# Patient Record
Sex: MALE | Race: WHITE | NOT HISPANIC OR LATINO | Employment: FULL TIME | URBAN - NONMETROPOLITAN AREA
[De-identification: names, ages, dates, MRNs, and addresses within clinical notes are randomized per-mention and may not be internally consistent; named-entity substitution may affect disease eponyms.]

---

## 2017-02-28 ENCOUNTER — OFFICE VISIT (OUTPATIENT)
Dept: GASTROENTEROLOGY | Facility: CLINIC | Age: 32
End: 2017-02-28

## 2017-02-28 VITALS
BODY MASS INDEX: 29.7 KG/M2 | SYSTOLIC BLOOD PRESSURE: 134 MMHG | DIASTOLIC BLOOD PRESSURE: 86 MMHG | HEIGHT: 67 IN | WEIGHT: 189.2 LBS | HEART RATE: 73 BPM

## 2017-02-28 DIAGNOSIS — K62.5 RECTAL BLEEDING: ICD-10-CM

## 2017-02-28 DIAGNOSIS — K62.89 ANAL OR RECTAL PAIN: Primary | ICD-10-CM

## 2017-02-28 PROCEDURE — 99213 OFFICE O/P EST LOW 20 MIN: CPT | Performed by: INTERNAL MEDICINE

## 2017-02-28 RX ORDER — LIDOCAINE HYDROCHLORIDE 10 MG/ML
0.1 INJECTION, SOLUTION EPIDURAL; INFILTRATION; INTRACAUDAL; PERINEURAL ONCE AS NEEDED
Status: CANCELLED | OUTPATIENT
Start: 2017-02-28

## 2017-02-28 RX ORDER — SODIUM CHLORIDE 0.9 % (FLUSH) 0.9 %
1-10 SYRINGE (ML) INJECTION AS NEEDED
Status: CANCELLED | OUTPATIENT
Start: 2017-03-02

## 2017-02-28 RX ORDER — DEXTROSE AND SODIUM CHLORIDE 5; .45 G/100ML; G/100ML
30 INJECTION, SOLUTION INTRAVENOUS CONTINUOUS PRN
Status: CANCELLED | OUTPATIENT
Start: 2017-03-02

## 2017-03-01 RX ORDER — HYDROCODONE BITARTRATE AND ACETAMINOPHEN 10; 325 MG/1; MG/1
1 TABLET ORAL EVERY 6 HOURS PRN
Status: ON HOLD | COMMUNITY
End: 2017-03-02

## 2017-03-01 RX ORDER — DEXTROSE AND SODIUM CHLORIDE 5; .45 G/100ML; G/100ML
20 INJECTION, SOLUTION INTRAVENOUS CONTINUOUS
Status: CANCELLED | OUTPATIENT
Start: 2017-03-02

## 2017-03-01 NOTE — PROGRESS NOTES
Chief Complaint   Patient presents with   • Colonoscopy Consultation     Raymon Gusman is a 31 y.o. male who presents with rectal bleeding and pain  HPI:  This patient was previously seen by me in 2016.  That note is available for review.  He has a family history of carcinoma the colon was felt he needed to have a colonoscopy but it was not done.    The patient states that he is having a great deal of pain in the rectal area with bowel movements.  His been present for 2 years.  In addition there is crampy abdominal pain.  There is frequent passage of blood per rectum.  States that we'll take him as long as 30-40 minutes to have a bowel movement.  He complains of severe pain with a bowel movement.    The patient had a CT scan of the abdomen and pelvis done in March 2016 which was normal.  He has never had a colonoscopy.  Past Medical History   Diagnosis Date   • Blood in stool    • Diarrhea    • Family history of malignant neoplasm of gastrointestinal tract    • Gastroesophageal reflux disease    • GERD (gastroesophageal reflux disease)    • Rectal hemorrhage      Past Surgical History   Procedure Laterality Date   • Appendectomy  At Age Of 11 - 12   • Other surgical history       Left Forearm - Steel Plate       Current Outpatient Prescriptions:   •  omeprazole (PriLOSEC) 40 MG capsule, Take 40 mg by mouth 2 (two) times a day., Disp: , Rfl:   •  tiZANidine (ZANAFLEX) 4 MG tablet, Take 4 mg by mouth every 8 (eight) hours as needed for muscle spasms., Disp: , Rfl:   •  HYDROcodone-acetaminophen (NORCO)  MG per tablet, Take 1 tablet by mouth Every 6 (Six) Hours As Needed for moderate pain (4-6)., Disp: , Rfl:   No Known Allergies  Social History     Social History   • Marital status: Single     Spouse name: N/A   • Number of children: N/A   • Years of education: N/A     Occupational History   • Not on file.     Social History Main Topics   • Smoking status: Current Every Day Smoker     Packs/day: 0.50      Years: 17.00   • Smokeless tobacco: Never Used   • Alcohol use No   • Drug use: No   • Sexual activity: Defer     Other Topics Concern   • Not on file     Social History Narrative     Family History   Problem Relation Age of Onset   • Colon cancer Other    • Hypertension Other    • Liver cancer Other    • Colon cancer Maternal Aunt    • Liver cancer Maternal Aunt    • Other Maternal Grandmother    • Esophageal cancer Maternal Grandfather    • Colon cancer Other      Review of Systems   Constitutional: Negative for activity change, chills, diaphoresis and unexpected weight change.   Cardiovascular: Negative for chest pain.   Gastrointestinal: Positive for abdominal pain, anal bleeding, blood in stool and rectal pain. Negative for abdominal distention, constipation, diarrhea, nausea and vomiting.   Musculoskeletal: Negative for arthralgias and myalgias.     Vitals:    02/28/17 1344   BP: 134/86   Pulse: 73     Physical Exam   Constitutional: He appears well-developed and well-nourished.   Cardiovascular: Normal rate, regular rhythm and normal heart sounds.  Exam reveals no gallop and no friction rub.    No murmur heard.  Pulmonary/Chest: Effort normal and breath sounds normal. No respiratory distress. He has no rales.   Abdominal: Soft. Normal appearance and bowel sounds are normal. He exhibits no distension and no ascites. There is no hepatosplenomegaly, splenomegaly or hepatomegaly. There is tenderness. No hernia.   There is tenderness to palpation in the right lower quadrant.  No masses felt.   Nursing note and vitals reviewed.    No images are attached to the encounter.  No notes on file  Raymon was seen today for colonoscopy consultation.    Diagnoses and all orders for this visit:    Anal or rectal pain  -     Case Request; Standing  -     sodium chloride 0.9 % flush 1-10 mL; Infuse 1-10 mL into a venous catheter As Needed for line care.  -     lidocaine PF 1% (XYLOCAINE) injection 0.1 mL; Inject 0.1 mL into the  skin 1 (One) Time As Needed (for IV access).  -     dextrose 5 % and sodium chloride 0.45 % infusion; Infuse 30 mL/hr into a venous catheter Continuous As Needed (Start Prior to Procedure).  -     Case Request    Rectal bleeding  -     Case Request; Standing  -     sodium chloride 0.9 % flush 1-10 mL; Infuse 1-10 mL into a venous catheter As Needed for line care.  -     lidocaine PF 1% (XYLOCAINE) injection 0.1 mL; Inject 0.1 mL into the skin 1 (One) Time As Needed (for IV access).  -     dextrose 5 % and sodium chloride 0.45 % infusion; Infuse 30 mL/hr into a venous catheter Continuous As Needed (Start Prior to Procedure).  -     Case Request    Other orders  -     Implement Anesthesia Orders Day of Procedure; Standing  -     Obtain Informed Consent; Standing  -     Verify Informed Consent; Standing  -     Verify bowel prep was successful; Standing  -     Protime-INR; Standing  -     POC Glucose Fingerstick; Standing  -     Insert Peripheral IV; Standing  -     Saline Lock & Maintain IV Access; Standing     plan:  Colonoscopy          This document has been electronically signed by Jean Carlos Ivy MD on March 1, 2017 4:22 PM

## 2017-03-02 ENCOUNTER — ANESTHESIA (OUTPATIENT)
Dept: GASTROENTEROLOGY | Facility: HOSPITAL | Age: 32
End: 2017-03-02

## 2017-03-02 ENCOUNTER — HOSPITAL ENCOUNTER (OUTPATIENT)
Facility: HOSPITAL | Age: 32
Setting detail: HOSPITAL OUTPATIENT SURGERY
Discharge: HOME | End: 2017-03-02
Attending: INTERNAL MEDICINE | Admitting: INTERNAL MEDICINE

## 2017-03-02 ENCOUNTER — ANESTHESIA EVENT (OUTPATIENT)
Dept: GASTROENTEROLOGY | Facility: HOSPITAL | Age: 32
End: 2017-03-02

## 2017-03-02 VITALS
HEART RATE: 104 BPM | HEIGHT: 67 IN | SYSTOLIC BLOOD PRESSURE: 113 MMHG | WEIGHT: 185.85 LBS | TEMPERATURE: 97.3 F | BODY MASS INDEX: 29.17 KG/M2 | DIASTOLIC BLOOD PRESSURE: 79 MMHG | RESPIRATION RATE: 18 BRPM | OXYGEN SATURATION: 94 %

## 2017-03-02 DIAGNOSIS — K62.89 ANAL OR RECTAL PAIN: ICD-10-CM

## 2017-03-02 DIAGNOSIS — K62.5 RECTAL BLEEDING: ICD-10-CM

## 2017-03-02 PROCEDURE — 25010000002 PROPOFOL 10 MG/ML EMULSION: Performed by: NURSE ANESTHETIST, CERTIFIED REGISTERED

## 2017-03-02 PROCEDURE — 88305 TISSUE EXAM BY PATHOLOGIST: CPT | Performed by: PATHOLOGY

## 2017-03-02 PROCEDURE — 88305 TISSUE EXAM BY PATHOLOGIST: CPT | Performed by: INTERNAL MEDICINE

## 2017-03-02 PROCEDURE — 25010000002 FENTANYL CITRATE (PF) 100 MCG/2ML SOLUTION: Performed by: NURSE ANESTHETIST, CERTIFIED REGISTERED

## 2017-03-02 PROCEDURE — 45380 COLONOSCOPY AND BIOPSY: CPT | Performed by: INTERNAL MEDICINE

## 2017-03-02 PROCEDURE — 25010000002 MIDAZOLAM PER 1 MG: Performed by: NURSE ANESTHETIST, CERTIFIED REGISTERED

## 2017-03-02 RX ORDER — DEXTROSE AND SODIUM CHLORIDE 5; .45 G/100ML; G/100ML
30 INJECTION, SOLUTION INTRAVENOUS CONTINUOUS PRN
Status: DISCONTINUED | OUTPATIENT
Start: 2017-03-02 | End: 2017-03-02 | Stop reason: HOSPADM

## 2017-03-02 RX ORDER — MIDAZOLAM HYDROCHLORIDE 1 MG/ML
INJECTION INTRAMUSCULAR; INTRAVENOUS AS NEEDED
Status: DISCONTINUED | OUTPATIENT
Start: 2017-03-02 | End: 2017-03-02 | Stop reason: SURG

## 2017-03-02 RX ORDER — LIDOCAINE HYDROCHLORIDE 10 MG/ML
0.1 INJECTION, SOLUTION EPIDURAL; INFILTRATION; INTRACAUDAL; PERINEURAL ONCE AS NEEDED
Status: DISCONTINUED | OUTPATIENT
Start: 2017-03-02 | End: 2017-03-02 | Stop reason: HOSPADM

## 2017-03-02 RX ORDER — PROPOFOL 10 MG/ML
VIAL (ML) INTRAVENOUS AS NEEDED
Status: DISCONTINUED | OUTPATIENT
Start: 2017-03-02 | End: 2017-03-02 | Stop reason: SURG

## 2017-03-02 RX ORDER — FENTANYL CITRATE 50 UG/ML
INJECTION, SOLUTION INTRAMUSCULAR; INTRAVENOUS AS NEEDED
Status: DISCONTINUED | OUTPATIENT
Start: 2017-03-02 | End: 2017-03-02 | Stop reason: SURG

## 2017-03-02 RX ORDER — DEXTROSE AND SODIUM CHLORIDE 5; .45 G/100ML; G/100ML
20 INJECTION, SOLUTION INTRAVENOUS CONTINUOUS
Status: CANCELLED | OUTPATIENT
Start: 2017-03-02

## 2017-03-02 RX ORDER — SODIUM CHLORIDE 0.9 % (FLUSH) 0.9 %
1-10 SYRINGE (ML) INJECTION AS NEEDED
Status: DISCONTINUED | OUTPATIENT
Start: 2017-03-02 | End: 2017-03-02 | Stop reason: HOSPADM

## 2017-03-02 RX ADMIN — PROPOFOL 20 MG: 10 INJECTION, EMULSION INTRAVENOUS at 16:00

## 2017-03-02 RX ADMIN — PROPOFOL 10 MG: 10 INJECTION, EMULSION INTRAVENOUS at 16:06

## 2017-03-02 RX ADMIN — PROPOFOL 80 MG: 10 INJECTION, EMULSION INTRAVENOUS at 15:55

## 2017-03-02 RX ADMIN — MIDAZOLAM 2 MG: 1 INJECTION INTRAMUSCULAR; INTRAVENOUS at 15:50

## 2017-03-02 RX ADMIN — FENTANYL CITRATE 50 MCG: 50 INJECTION, SOLUTION INTRAMUSCULAR; INTRAVENOUS at 15:50

## 2017-03-02 RX ADMIN — PROPOFOL 20 MG: 10 INJECTION, EMULSION INTRAVENOUS at 16:04

## 2017-03-02 RX ADMIN — DEXTROSE AND SODIUM CHLORIDE 30 ML/HR: 5; 450 INJECTION, SOLUTION INTRAVENOUS at 15:21

## 2017-03-02 RX ADMIN — FENTANYL CITRATE 25 MCG: 50 INJECTION, SOLUTION INTRAMUSCULAR; INTRAVENOUS at 16:00

## 2017-03-02 NOTE — PLAN OF CARE
Problem: Patient Care Overview (Adult)  Goal: Plan of Care Review    03/02/17 1624   Coping/Psychosocial Response Interventions   Plan Of Care Reviewed With patient   Patient Care Overview   Progress no change   Outcome Evaluation   Outcome Summary/Follow up Plan vss, pt ready for d/c         Problem: GI Endoscopy (Adult)  Goal: Signs and Symptoms of Listed Potential Problems Will be Absent or Manageable (GI Endoscopy)  Outcome: Outcome(s) achieved Date Met:  03/02/17 03/02/17 1624   GI Endoscopy   Problems Assessed (GI Endoscopy) all   Problems Present (GI Endoscopy) none

## 2017-03-02 NOTE — PLAN OF CARE
Problem: Patient Care Overview (Adult)  Goal: Plan of Care Review  Outcome: Ongoing (interventions implemented as appropriate)    03/02/17 1600   Coping/Psychosocial Response Interventions   Plan Of Care Reviewed With patient   Patient Care Overview   Progress no change   Outcome Evaluation   Outcome Summary/Follow up Plan vss         Problem: GI Endoscopy (Adult)  Goal: Signs and Symptoms of Listed Potential Problems Will be Absent or Manageable (GI Endoscopy)  Outcome: Ongoing (interventions implemented as appropriate)    03/02/17 1600   GI Endoscopy   Problems Assessed (GI Endoscopy) all   Problems Present (GI Endoscopy) none

## 2017-03-02 NOTE — ANESTHESIA POSTPROCEDURE EVALUATION
Patient: Raymon Gusman    Procedure Summary     Date Anesthesia Start Anesthesia Stop Room / Location    03/02/17 1553 1613 [facility] ENDOSCOPY 1 / [facility] ENDOSCOPY       Procedure Diagnosis Surgeon Provider    COLONOSCOPY (N/A ) Anal or rectal pain; Rectal bleeding  (Anal or rectal pain [K62.89]; Rectal bleeding [K62.5]) MD Chele Delgado CRNA          Anesthesia Type: MAC  Last vitals  BP      Temp      Pulse     Resp      SpO2        Post Anesthesia Care and Evaluation    Patient location during evaluation: bedside  Patient participation: complete - patient participated  Level of consciousness: awake and alert  Pain score: 0  Pain management: adequate  Airway patency: patent  Anesthetic complications: No anesthetic complications    Cardiovascular status: acceptable and stable  Respiratory status: acceptable and spontaneous ventilation  Hydration status: acceptable

## 2017-03-02 NOTE — ANESTHESIA PREPROCEDURE EVALUATION
Anesthesia Evaluation     NPO Status: > 8 hours   Airway   Mallampati: I  TM distance: >3 FB  Neck ROM: full  Dental    (+) edentulous    Pulmonary - normal exam   Cardiovascular - normal exam  Exercise tolerance: good (4-7 METS)        Neuro/Psych  GI/Hepatic/Renal/Endo    (+)  GERD well controlled,     Musculoskeletal     Abdominal    Substance History      OB/GYN          Other                                    Anesthesia Plan    ASA 2     MAC     Anesthetic plan and risks discussed with patient.    Plan discussed with CRNA.

## 2017-03-06 LAB
LAB AP CASE REPORT: NORMAL
Lab: NORMAL
PATH REPORT.FINAL DX SPEC: NORMAL
PATH REPORT.GROSS SPEC: NORMAL

## 2017-05-24 ENCOUNTER — TELEPHONE (OUTPATIENT)
Dept: GASTROENTEROLOGY | Facility: CLINIC | Age: 32
End: 2017-05-24

## 2018-01-10 ENCOUNTER — TRANSCRIBE ORDERS (OUTPATIENT)
Dept: ORTHOPEDIC SURGERY | Facility: CLINIC | Age: 33
End: 2018-01-10

## 2018-01-10 DIAGNOSIS — M79.641 RIGHT HAND PAIN: Primary | ICD-10-CM

## 2018-01-24 ENCOUNTER — OFFICE VISIT (OUTPATIENT)
Dept: ORTHOPEDIC SURGERY | Facility: CLINIC | Age: 33
End: 2018-01-24

## 2018-01-24 VITALS — WEIGHT: 184 LBS | BODY MASS INDEX: 28.88 KG/M2 | HEIGHT: 67 IN

## 2018-01-24 DIAGNOSIS — G56.03 BILATERAL CARPAL TUNNEL SYNDROME: ICD-10-CM

## 2018-01-24 DIAGNOSIS — M79.641 RIGHT HAND PAIN: Primary | ICD-10-CM

## 2018-01-24 DIAGNOSIS — R20.0 NUMBNESS AND TINGLING IN RIGHT HAND: ICD-10-CM

## 2018-01-24 DIAGNOSIS — R20.2 NUMBNESS AND TINGLING IN RIGHT HAND: ICD-10-CM

## 2018-01-24 PROCEDURE — 99202 OFFICE O/P NEW SF 15 MIN: CPT | Performed by: ORTHOPAEDIC SURGERY

## 2018-01-24 RX ORDER — TRAMADOL HYDROCHLORIDE 50 MG/1
TABLET ORAL
Refills: 0 | COMMUNITY
Start: 2018-01-08 | End: 2021-01-15

## 2018-01-24 NOTE — PROGRESS NOTES
Raymon Gusman is a 32 y.o. male   Primary provider:  NICKI Myles       Chief Complaint   Patient presents with   • Right Hand - Pain       HISTORY OF PRESENT ILLNESS: right hand pain started about 4 months ago no injury that  Patient can recall, xrays done today. Patient has a lot of throbbing in hand when working.     Hand Pain    The incident occurred more than 1 week ago. The incident occurred at home. The pain is present in the right hand. The quality of the pain is described as aching, burning and stabbing. The pain does not radiate. The pain is at a severity of 4/10. The pain is severe. The pain has been constant since the incident. Nothing aggravates the symptoms.   Patient's had a previous injury to the left arm injury sustained a fracture he's had surgery on the left wrist similar symptoms on the left side is the right side he is right-handed when his job he has to apparently pump something up and then undo an obvious just a constant repetitious type activity he is temporarily employed now he says he's going to be put on February is not claiming this is Workmen's Comp. 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 but were not here to evaluate his back today     CONCURRENT MEDICAL HISTORY:    Past Medical History:   Diagnosis Date   • Blood in stool    • Diarrhea    • Family history of malignant neoplasm of gastrointestinal tract    • Gastroesophageal reflux disease    • GERD (gastroesophageal reflux disease)    • Rectal hemorrhage        No Known Allergies      Current Outpatient Prescriptions:   •  omeprazole (PriLOSEC) 40 MG capsule, Take 40 mg by mouth 2 (two) times a day., Disp: , Rfl:   •  PROCTOFOAM HC 1-1 % rectal foam, APPLY ONE APPLICATORFUL INTO RECTUM TWICE DAILY FOR 14 DAYS, Disp: 1 each, Rfl: 0  •  tiZANidine (ZANAFLEX) 4 MG tablet, Take 4 mg by mouth every 8 (eight) hours as needed for muscle spasms., Disp:  ", Rfl:   •  traMADol (ULTRAM) 50 MG tablet, , Disp: , Rfl: 0    Past Surgical History:   Procedure Laterality Date   • APPENDECTOMY  At Age Of 11 - 12   • COLONOSCOPY N/A 3/2/2017    Procedure: COLONOSCOPY;  Surgeon: Jean Carlos Ivy MD;  Location: [facility] ENDOSCOPY;  Service:    • FRACTURE SURGERY     • OTHER SURGICAL HISTORY      Left Forearm - Steel Plate       Family History   Problem Relation Age of Onset   • Colon cancer Other    • Hypertension Other    • Liver cancer Other    • Colon cancer Maternal Aunt    • Liver cancer Maternal Aunt    • Other Maternal Grandmother    • Esophageal cancer Maternal Grandfather    • Colon cancer Other         Social History     Social History   • Marital status: Single     Spouse name: N/A   • Number of children: N/A   • Years of education: N/A     Occupational History   • Not on file.     Social History Main Topics   • Smoking status: Current Every Day Smoker     Packs/day: 0.50     Years: 17.00   • Smokeless tobacco: Never Used   • Alcohol use No   • Drug use: No   • Sexual activity: Defer     Other Topics Concern   • Not on file     Social History Narrative        Review of Systems   Musculoskeletal: Positive for joint swelling.        Joint pain, stiffness    All other systems reviewed and are negative.      PHYSICAL EXAMINATION:       Ht 170.2 cm (67\")  Wt 83.5 kg (184 lb)  BMI 28.82 kg/m2    Physical Exam    GAIT:     [x]  Normal  []  Antalgic    Assistive device: [x]  None  []  Walker     []  Crutches  []  Cane     []  Wheelchair  []  Stretcher    Ortho Exam semination both hands and wrists show basically a full range of motion he's got a 6 inch to 7 inch scar by 1 cm in width on the left radial aspect of the arm on the left arm which is nontender not restrict motion he's got maybe a minimal Tinel's on the right wrist is Levi test shows slow circulation the both radial ulnar arteries are present on the right side x-rays of the wrist and hand are normal plan " diagnosis is carpal tunnel plan is an evaluation      No results found.        ASSESSMENT:    Diagnoses and all orders for this visit:    Right hand pain  -     XR Hand 3+ View Right  -     EMG & Nerve Conduction Test; Future    Numbness and tingling in right hand    Bilateral carpal tunnel syndrome    Other orders  -     traMADol (ULTRAM) 50 MG tablet;           PLANReturn for Recheck.    Jose Gill MD

## 2018-02-02 ENCOUNTER — HOSPITAL ENCOUNTER (EMERGENCY)
Facility: HOSPITAL | Age: 33
Discharge: HOME | End: 2018-02-02
Attending: EMERGENCY MEDICINE | Admitting: FAMILY MEDICINE

## 2018-02-02 VITALS
DIASTOLIC BLOOD PRESSURE: 77 MMHG | OXYGEN SATURATION: 97 % | TEMPERATURE: 97.6 F | HEIGHT: 67 IN | RESPIRATION RATE: 16 BRPM | SYSTOLIC BLOOD PRESSURE: 147 MMHG | HEART RATE: 70 BPM | BODY MASS INDEX: 28.25 KG/M2 | WEIGHT: 180 LBS

## 2018-02-02 DIAGNOSIS — T15.92XS: Primary | ICD-10-CM

## 2018-02-02 DIAGNOSIS — S05.02XA ABRASION OF LEFT CORNEA, INITIAL ENCOUNTER: ICD-10-CM

## 2018-02-02 PROCEDURE — 99283 EMERGENCY DEPT VISIT LOW MDM: CPT

## 2018-02-02 RX ORDER — PROPARACAINE HYDROCHLORIDE 5 MG/ML
SOLUTION/ DROPS OPHTHALMIC
Status: DISCONTINUED
Start: 2018-02-02 | End: 2018-02-02 | Stop reason: WASHOUT

## 2018-02-02 RX ORDER — PURIFIED WATER 986 MG/ML
SOLUTION OPHTHALMIC
Status: COMPLETED
Start: 2018-02-02 | End: 2018-02-02

## 2018-02-02 RX ORDER — HYDROCODONE BITARTRATE AND ACETAMINOPHEN 7.5; 325 MG/1; MG/1
1 TABLET ORAL EVERY 6 HOURS PRN
Qty: 12 TABLET | Refills: 0 | Status: SHIPPED | OUTPATIENT
Start: 2018-02-02 | End: 2018-02-05

## 2018-02-02 RX ORDER — TETRACAINE HYDROCHLORIDE 5 MG/ML
2 SOLUTION OPHTHALMIC ONCE
Status: COMPLETED | OUTPATIENT
Start: 2018-02-02 | End: 2018-02-02

## 2018-02-02 RX ORDER — NEOMYCIN SULFATE, POLYMYXIN B SULFATE AND BACITRACIN ZINC 3.5; 10000; 4 MG/G; [USP'U]/G; [USP'U]/G
OINTMENT OPHTHALMIC 4 TIMES DAILY
Qty: 3.5 G | Refills: 0 | Status: SHIPPED | OUTPATIENT
Start: 2018-02-02 | End: 2018-02-09

## 2018-02-02 RX ADMIN — PURIFIED WATER: 986 SOLUTION OPHTHALMIC at 14:43

## 2018-02-02 RX ADMIN — FLUORESCEIN SODIUM 1 STRIP: 1 STRIP OPHTHALMIC at 15:38

## 2018-02-02 RX ADMIN — TETRACAINE HYDROCHLORIDE 2 DROP: 5 SOLUTION OPHTHALMIC at 14:45

## 2018-02-02 NOTE — DISCHARGE INSTRUCTIONS
Abrasion  Introduction  An abrasion is a cut or scrape on the surface of your skin. An abrasion does not go through all of the layers of your skin. It is important to take good care of your abrasion to prevent infection.  Follow these instructions at home:  Medicines  · Take or apply medicines only as told by your doctor.  · If you were prescribed an antibiotic ointment, finish all of it even if you start to feel better.  Wound care  · Clean the wound with mild soap and water 2-3 times per day or as told by your doctor. Pat your wound dry with a clean towel. Do not rub it.  · There are many ways to close and cover a wound. Follow instructions from your doctor about:  ¨ How to take care of your wound.  ¨ When and how you should change your bandage (dressing).  ¨ When and how you should take off your dressing.  · Check your wound every day for signs of infection. Watch for:  ¨ Redness, swelling, or pain.  ¨ Fluid, blood, or pus.  General instructions  · Keep the dressing dry as told by your doctor. Do not take baths, swim, use a hot tub, or do anything that would put your wound underwater until your doctor says it is okay.  · If there is swelling, raise (elevate) the injured area above the level of your heart while you are sitting or lying down.  · Keep all follow-up visits as told by your doctor. This is important.  Contact a doctor if:  · You were given a tetanus shot and you have any of these where the needle went in:  ¨ Swelling.  ¨ Very bad pain.  ¨ Redness.  ¨ Bleeding.  · Medicine does not help your pain.  · You have any of these at the site of the wound:  ¨ More redness.  ¨ More swelling.  ¨ More pain.  Get help right away if:  · You have a red streak going away from your wound.  · You have a fever.  · You have fluid, blood, or pus coming from your wound.  · There is a bad smell coming from your wound.  This information is not intended to replace advice given to you by your health care provider. Make sure you  discuss any questions you have with your health care provider.  Document Released: 06/05/2009 Document Revised: 05/25/2017 Document Reviewed: 12/16/2015  © 2017 [employer]

## 2018-02-02 NOTE — ED PROVIDER NOTES
Subjective   HPI Comments: Patient thinks he has a piece of metal in his eye. He has had it multiple times before. States that it has been bothering him since the beginning of the week.     Patient is a 32 y.o. male presenting with eye problem.   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    HENT: Positive for ear pain. Negative for congestion, dental problem, drooling, ear discharge, facial swelling, hearing loss, mouth sores, nosebleeds, postnasal drip, rhinorrhea, sinus pain, sinus pressure, sneezing, sore throat, tinnitus, trouble swallowing and voice change.    Eyes: Negative.  Negative for blurred vision, double vision, photophobia, discharge, redness and itching.   Respiratory: Negative.    Cardiovascular: Negative.    Gastrointestinal: Negative.  Negative for nausea and vomiting.   Endocrine: Negative.    Genitourinary: Negative.     Musculoskeletal: Negative.    Skin: Negative.    Allergic/Immunologic: Negative.    Neurological: Negative.  Negative for tingling, weakness, numbness and headaches.   Hematological: Negative.    Psychiatric/Behavioral: Negative.        Past Medical History:   Diagnosis Date   • Blood in stool    • Diarrhea    • Family history of malignant neoplasm of gastrointestinal tract    • Gastroesophageal reflux disease    • GERD (gastroesophageal reflux disease)    • Rectal hemorrhage        No Known Allergies    Past Surgical History:   Procedure Laterality Date   • APPENDECTOMY  At Age Of 11 - 12   • COLONOSCOPY N/A 3/2/2017    Procedure: COLONOSCOPY;  Surgeon: Jean Carlos Ivy MD;  Location: [facility] ENDOSCOPY;  Service:    • FRACTURE SURGERY     • OTHER SURGICAL HISTORY      Left Forearm - Steel Plate       Family History   Problem Relation Age of Onset   • Colon cancer Other    • Hypertension Other    • Liver cancer Other    • Colon cancer Maternal Aunt    • Liver cancer Maternal Aunt    • Other Maternal Grandmother    • Esophageal cancer Maternal Grandfather    • Colon cancer Other        Social History     Social History   • Marital status: Single     Spouse name: N/A   • Number of children: N/A   • Years of education: N/A     Social History Main Topics   • Smoking status: Current Every Day Smoker     Packs/day: 1.00     Years: 17.00   • Smokeless tobacco: Never Used   • Alcohol use No   • Drug use: No   • Sexual activity: Defer     Other Topics Concern   • None     Social History Narrative   • None           Objective   Physical Exam   Constitutional: He is oriented to person, place, and time. He appears well-developed and well-nourished. No distress.   HENT:   Head: Normocephalic and atraumatic. Not macrocephalic and not microcephalic. Head is without raccoon's eyes.   Eyes: Conjunctivae and EOM are normal. Pupils are equal, round, and reactive to light. Right eye exhibits no chemosis, no discharge, no exudate  and no hordeolum. No foreign body present in the right eye. Left eye exhibits no chemosis, no discharge, no exudate and no hordeolum. Foreign body present in the left eye. Right conjunctiva is not injected. Right conjunctiva has no hemorrhage. Left conjunctiva is not injected. Left conjunctiva has no hemorrhage. No scleral icterus. Right eye exhibits normal extraocular motion and no nystagmus. Left eye exhibits normal extraocular motion and no nystagmus. Right pupil is round and reactive. Left pupil is round and reactive. Pupils are equal.       Neck: Normal range of motion. Neck supple. No JVD present. No tracheal deviation present. No thyromegaly present.   Cardiovascular: Normal rate, regular rhythm, normal heart sounds and intact distal pulses.  Exam reveals no gallop and no friction rub.    No murmur heard.  Pulmonary/Chest: Effort normal and breath sounds normal. No stridor. No respiratory distress. He has no wheezes. He has no rales. He exhibits no tenderness.   Abdominal: Soft. Bowel sounds are normal. He exhibits no distension and no mass. There is no tenderness. There is no rebound and no guarding. No hernia.   Musculoskeletal: Normal range of motion. He exhibits no edema, tenderness or deformity.   Lymphadenopathy:     He has no cervical adenopathy.   Neurological: He is alert and oriented to person, place, and time. He has normal reflexes. He displays normal reflexes. No cranial nerve deficit. He exhibits normal muscle tone. Coordination normal.   Skin: Skin is warm and dry. No rash noted. He is not diaphoretic. No erythema. No pallor.   Psychiatric: He has a normal mood and affect. His behavior is normal. Judgment and thought content normal.   Nursing note and vitals reviewed.      Foreign Body Removal - Ocular  Date/Time: 2/2/2018 4:51 PM  Performed by: VELMA THOMAS  Authorized by: LATONIA BAH     Consent:     Consent obtained:  Verbal and written    Consent given by:  Patient    Risks  discussed:  Bleeding, globe perforation, pain, corneal damage, incomplete removal, visual impairment, damage to surrounding structures, infection and worsening of condition    Alternatives discussed:  No treatment, delayed treatment, alternative treatment, observation and referral  Location:     Location:  L corneal    Depth:  Superficial  Pre-procedure details:     Imaging:  None    Correction: corrected      Fluorescein exam: yes      Fluorescein uptake: yes      Corneal abrasion location:  Central  Anesthesia (see MAR for exact dosages):     Local anesthetic:  Tetracaine drops  Procedure details:     Localization method:  Direct visualization    Removal mechanism:  25 G needle and irrigation    Foreign bodies recovered:  None    Intact foreign body removal: no      Residual rust ring observed: no      Residual rust ring removed: no    Post-procedure details:     Patient tolerance of procedure:  Tolerated well, no immediate complications  Comments:      Removal unsuccessful, patient will see opthalmology on Monday.              ED Course  ED Course      Opthalmology clinic was called and they can see the patient in their [place] office on Monday but does not have anything available today. Patient understands and will see them on Monday.           MDM    Final diagnoses:   Foreign body in eye, left, sequela   Abrasion of left cornea, initial encounter            SHEEBA Whipple  02/02/18 1651       SHEEBA Whipple  02/02/18 1656

## 2018-02-27 ENCOUNTER — OFFICE VISIT (OUTPATIENT)
Dept: SURGERY | Facility: CLINIC | Age: 33
End: 2018-02-27

## 2018-02-27 VITALS
WEIGHT: 179 LBS | BODY MASS INDEX: 28.09 KG/M2 | HEIGHT: 67 IN | SYSTOLIC BLOOD PRESSURE: 142 MMHG | DIASTOLIC BLOOD PRESSURE: 76 MMHG

## 2018-02-27 DIAGNOSIS — K64.1 GRADE II HEMORRHOIDS: Primary | ICD-10-CM

## 2018-02-27 PROCEDURE — 99204 OFFICE O/P NEW MOD 45 MIN: CPT | Performed by: SURGERY

## 2018-02-27 NOTE — PROGRESS NOTES
CHIEF COMPLAINT:    Evaluate hemorrhoids    HISTORY OF PRESENT ILLNESS:    Raymon Gusman is a 32 y.o. male who presents for evaluation of possible hemorrhoids.  He states for the past 5-6 years he has had intermittent blood on toilet paper after bowel movements.  He did have a colonoscopy in March 2017 that showed mild inflammation near the anus but no other significant findings.  He states that he has a bowel movement every 2 or 3 days.  He frequently has blood on the toilet paper when wiping.  He feels like some hemorrhoidal tissue prolapses during bowel movements.  He also notes burning and itching in the area of his anus between bowel movements.  He notes no pain during bowel movements.    Past Medical History:   Diagnosis Date   • Blood in stool    • Diarrhea    • Family history of malignant neoplasm of gastrointestinal tract    • Gastroesophageal reflux disease    • GERD (gastroesophageal reflux disease)    • Rectal hemorrhage        Past Surgical History:   Procedure Laterality Date   • APPENDECTOMY  At Age Of 11 - 12   • COLONOSCOPY N/A 3/2/2017    Procedure: COLONOSCOPY;  Surgeon: Jean Carlos Ivy MD;  Location: [facility] ENDOSCOPY;  Service:    • FRACTURE SURGERY     • OTHER SURGICAL HISTORY      Left Forearm - Steel Plate       Prior to Admission medications    Medication Sig Start Date End Date Taking? Authorizing Provider   omeprazole (PriLOSEC) 40 MG capsule Take 40 mg by mouth 2 (two) times a day.   Yes Historical Provider, MD   tiZANidine (ZANAFLEX) 4 MG tablet Take 4 mg by mouth every 8 (eight) hours as needed for muscle spasms.   Yes Historical Provider, MD   PROCTOFOAM HC 1-1 % rectal foam APPLY ONE APPLICATORFUL INTO RECTUM TWICE DAILY FOR 14 DAYS 6/24/17   Jean Carlos vIy MD   traMADol (ULTRAM) 50 MG tablet  1/8/18   Historical Provider, MD       No Known Allergies    Family History   Problem Relation Age of Onset   • Colon cancer Other    • Hypertension Other    • Liver cancer  "Other    • Colon cancer Maternal Aunt    • Liver cancer Maternal Aunt    • Other Maternal Grandmother    • Esophageal cancer Maternal Grandfather    • Colon cancer Other        Social History     Social History   • Marital status: Single     Spouse name: N/A   • Number of children: N/A   • Years of education: N/A     Occupational History   • Not on file.     Social History Main Topics   • Smoking status: Current Every Day Smoker     Packs/day: 1.00     Years: 17.00   • Smokeless tobacco: Never Used   • Alcohol use No   • Drug use: No   • Sexual activity: Defer     Other Topics Concern   • Not on file     Social History Narrative       Review of Systems   Constitutional: Negative for activity change, appetite change, chills and fever.   HENT: Negative for hearing loss, nosebleeds and trouble swallowing.    Cardiovascular: Negative for chest pain, palpitations and leg swelling.   Gastrointestinal: Positive for blood in stool and constipation. Negative for abdominal distention, abdominal pain, anal bleeding, diarrhea, nausea, rectal pain and vomiting.        Hemorrhoids   Endocrine: Negative for cold intolerance, heat intolerance, polydipsia and polyuria.   Genitourinary: Negative for decreased urine volume, difficulty urinating, dysuria, enuresis, frequency, hematuria and urgency.   Musculoskeletal: Negative for arthralgias, back pain, gait problem, myalgias and neck pain.   Skin: Negative for pallor, rash and wound.   Allergic/Immunologic: Negative for immunocompromised state.   Neurological: Negative for dizziness, seizures, weakness, light-headedness, numbness and headaches.   Psychiatric/Behavioral: Negative for agitation and behavioral problems. The patient is not nervous/anxious.        Objective     /76  Ht 170.2 cm (67\")  Wt 81.2 kg (179 lb)  BMI 28.04 kg/m2    Physical Exam   Constitutional: He is oriented to person, place, and time. He appears well-developed and well-nourished.   HENT:   Head: " Normocephalic and atraumatic.   Nose: Nose normal.   Eyes: Conjunctivae and EOM are normal. Right eye exhibits no discharge. Left eye exhibits no discharge.   Neck: Trachea normal, normal range of motion and phonation normal. Neck supple. No JVD present. No tracheal deviation and no edema present. No thyromegaly present.   Cardiovascular: Normal rate, regular rhythm and normal heart sounds.  Exam reveals no gallop and no friction rub.    No murmur heard.  Pulmonary/Chest: Effort normal and breath sounds normal. No accessory muscle usage. No respiratory distress. He has no decreased breath sounds. He has no wheezes. He has no rales. He exhibits no tenderness.   Abdominal: Soft. He exhibits no distension, no fluid wave, no ascites, no pulsatile midline mass and no mass. There is no tenderness. There is no rebound and no guarding. No hernia.   Genitourinary:         Musculoskeletal: Normal range of motion. He exhibits no edema, tenderness or deformity.   Lymphadenopathy:     He has no cervical adenopathy.        Left: No supraclavicular adenopathy present.   Neurological: He is alert and oriented to person, place, and time. He has normal strength. No cranial nerve deficit.   Skin: Skin is warm and dry. No rash noted. He is not diaphoretic. No erythema. No pallor.   Psychiatric: He has a normal mood and affect. His speech is normal and behavior is normal. Judgment and thought content normal. Cognition and memory are normal.   Vitals reviewed.      DIAGNOSTIC DATA:    Reviewed colonoscopy note and images from March 2017, done by Dr. Ivy    ASSESSMENT:    Grade 2 internal hemorrhoids with minimal external hemorrhoids    PLAN:    Currently he has minimal bleeding and burning and itching from his hemorrhoids.  I suspect that his bowel habits contribute heavily to this.  We discussed the surgical treatment of hemorrhoids including stapled hemorrhoid pexy.  Currently he would not like to pursue surgery.  I recommended  that he increase his dietary fiber intake and take supplemental fiber twice daily.  We will see him back in 1 month to reevaluate.          This document has been electronically signed by Jose Worrell MD on February 27, 2018 5:27 PM

## 2021-01-15 ENCOUNTER — OFFICE VISIT (OUTPATIENT)
Dept: FAMILY MEDICINE CLINIC | Facility: CLINIC | Age: 36
End: 2021-01-15

## 2021-01-15 VITALS
OXYGEN SATURATION: 98 % | HEART RATE: 91 BPM | DIASTOLIC BLOOD PRESSURE: 88 MMHG | WEIGHT: 183.6 LBS | TEMPERATURE: 97.1 F | HEIGHT: 67 IN | BODY MASS INDEX: 28.82 KG/M2 | SYSTOLIC BLOOD PRESSURE: 138 MMHG

## 2021-01-15 DIAGNOSIS — K64.9 HEMORRHOIDS, UNSPECIFIED HEMORRHOID TYPE: Primary | ICD-10-CM

## 2021-01-15 PROCEDURE — 99213 OFFICE O/P EST LOW 20 MIN: CPT | Performed by: NURSE PRACTITIONER

## 2021-01-15 NOTE — PROGRESS NOTES
Subjective   Raymon Gusman is a 35 y.o. male. Hemorrhoids    History of Present Illness   Patient presents today for hemorrhoids. He says has has had hemorrhoids all his life. Recently his says his hemorrhoid has increased in size and became more painful. Notes small amount of bright red blood when he wipes with toilet paper. At home he says he takes stool softeners and denies any problems with constipation. Reports applying Preparation H OTC which provides some relief. Requests referral to General Surgery to discuss surgical options.    The following portions of the patient's history were reviewed and updated as appropriate: allergies, current medications, past family history, past medical history, past social history, past surgical history, and problem list.    Review of Systems   Constitutional: Negative for chills, fatigue and fever.   HENT: Negative for congestion, ear pain, rhinorrhea and sore throat.    Eyes: Negative for blurred vision, double vision and visual disturbance.   Respiratory: Negative for cough, chest tightness, shortness of breath and wheezing.    Cardiovascular: Negative for chest pain, palpitations and leg swelling.   Gastrointestinal: Positive for rectal pain (hemorrhoids). Negative for abdominal pain, diarrhea, nausea and vomiting.   Endocrine: Negative for cold intolerance and heat intolerance.   Genitourinary: Negative for difficulty urinating, dysuria, frequency and hematuria.   Musculoskeletal: Negative for arthralgias, back pain, neck pain and neck stiffness.   Skin: Negative for dry skin, pallor, rash, skin lesions and bruise.   Allergic/Immunologic: Negative for environmental allergies, food allergies and immunocompromised state.   Neurological: Negative for dizziness, syncope, weakness, light-headedness, headache and confusion.   Hematological: Negative for adenopathy. Does not bruise/bleed easily.   Psychiatric/Behavioral: Negative for self-injury, sleep disturbance,  suicidal ideas, depressed mood and stress. The patient is not nervous/anxious.        Vitals:    01/15/21 1005   BP: 138/88   Pulse: 91   Temp: 97.1 °F (36.2 °C)   SpO2: 98%     Body mass index is 28.76 kg/m².    Objective   Physical Exam  Vitals signs and nursing note reviewed.   Constitutional:       Appearance: He is well-developed.   HENT:      Head: Normocephalic.   Eyes:      Conjunctiva/sclera: Conjunctivae normal.   Neck:      Musculoskeletal: Full passive range of motion without pain, normal range of motion and neck supple.   Genitourinary:     Rectum: External hemorrhoid present.       Musculoskeletal: Normal range of motion.   Skin:     General: Skin is warm and dry.   Neurological:      Mental Status: He is alert and oriented to person, place, and time.      Coordination: Coordination normal.      Gait: Gait normal.   Psychiatric:         Speech: Speech normal.         Behavior: Behavior normal. Behavior is cooperative.         Thought Content: Thought content normal.         Judgment: Judgment normal.           Assessment/Plan   Diagnoses and all orders for this visit:    1. Hemorrhoids, unspecified hemorrhoid type (Primary)  -     Ambulatory Referral to General Surgery  -     Hydrocort-Pramoxine, Perianal, (PROCTOFOAM-HS) 1-1 % rectal foam; Insert 1 applicator into the rectum 3 (Three) Times a Day As Needed for Hemorrhoids.  Dispense: 10 g; Refill: 11    Hemorrhoids- Stressed importance of high fiber diet and continuing stool softeners to avoid constipation. Urgent referral placed to General Surgery due to size of hemorrhoid I know pt is very uncomfortable. Proctofoam 1 applicator into rectum TID PRN for hemorrhoids.  Discussed s/s that would warrant ER visit.   If symptoms do not improve or worsen, patient was instructed to return to clinic for further evaluation.         This document has been electronically signed by NICKI Colindres on  January 15, 2021 10:32 CST

## 2021-01-18 ENCOUNTER — OFFICE VISIT (OUTPATIENT)
Dept: SURGERY | Facility: CLINIC | Age: 36
End: 2021-01-18

## 2021-01-18 VITALS
BODY MASS INDEX: 29.03 KG/M2 | DIASTOLIC BLOOD PRESSURE: 82 MMHG | SYSTOLIC BLOOD PRESSURE: 126 MMHG | HEART RATE: 80 BPM | HEIGHT: 67 IN | TEMPERATURE: 98.6 F | WEIGHT: 185 LBS

## 2021-01-18 DIAGNOSIS — K64.1 GRADE II HEMORRHOIDS: Primary | ICD-10-CM

## 2021-01-18 PROCEDURE — 99213 OFFICE O/P EST LOW 20 MIN: CPT | Performed by: SURGERY

## 2021-01-20 NOTE — PROGRESS NOTES
CHIEF COMPLAINT:    Hemorrhoids    HISTORY OF PRESENT ILLNESS:    Raymon Gusman is a 35 y.o. male who has been seen previously in 2018 for evaluation of hemorrhoids.  Following that he canceled multiple appointments and has not been seen by us since.  He returns today for evaluation of his hemorrhoids once again.  He states that he has intermittent bulging of hemorrhoids in the perianal region after bowel movements.  He reports no pain with bowel movements.  He reports no recent blood in his stool.  He states that he has a bowel movement every 1 to 2 days currently.  He does intermittently take stool softeners.  He has previously had a colonoscopy in March 2017 showing mild inflammation in the anus.    Past Medical History:   Diagnosis Date   • Blood in stool    • Diarrhea    • Family history of malignant neoplasm of gastrointestinal tract    • Gastroesophageal reflux disease    • GERD (gastroesophageal reflux disease)    • Rectal hemorrhage        Past Surgical History:   Procedure Laterality Date   • APPENDECTOMY  At Age Of 11 - 12   • COLONOSCOPY N/A 3/2/2017    Procedure: COLONOSCOPY;  Surgeon: Jean Carlos Ivy MD;  Location: [facility] ENDOSCOPY;  Service:    • FRACTURE SURGERY     • OTHER SURGICAL HISTORY      Left Forearm - Steel Plate       Prior to Admission medications    Medication Sig Start Date End Date Taking? Authorizing Provider   Hydrocort-Pramoxine, Perianal, (PROCTOFOAM-HS) 1-1 % rectal foam Insert 1 applicator into the rectum 3 (Three) Times a Day As Needed for Hemorrhoids. 1/15/21  Yes Fadia Patterson APRN   omeprazole (PriLOSEC) 40 MG capsule Take 40 mg by mouth 2 (two) times a day.   Yes Provider, MD Tima       No Known Allergies    Family History   Problem Relation Age of Onset   • Colon cancer Other    • Hypertension Other    • Liver cancer Other    • Colon cancer Maternal Aunt    • Liver cancer Maternal Aunt    • Other Maternal Grandmother    • Esophageal cancer Maternal  "Grandfather    • Colon cancer Other        Social History     Socioeconomic History   • Marital status: Single     Spouse name: Not on file   • Number of children: Not on file   • Years of education: Not on file   • Highest education level: Not on file   Tobacco Use   • Smoking status: Current Every Day Smoker     Packs/day: 1.00     Years: 23.00     Pack years: 23.00     Types: Cigarettes     Start date: 1998   • Smokeless tobacco: Never Used   Substance and Sexual Activity   • Alcohol use: No   • Drug use: No   • Sexual activity: Defer       Review of Systems   Gastrointestinal: Positive for constipation. Negative for anal bleeding, blood in stool, diarrhea and nausea.       Objective     /82   Pulse 80   Temp 98.6 °F (37 °C) (Oral)   Ht 170.2 cm (67\")   Wt 83.9 kg (185 lb)   BMI 28.98 kg/m²     Physical Exam  Constitutional:       General: He is not in acute distress.     Appearance: Normal appearance. He is not ill-appearing, toxic-appearing or diaphoretic.   HENT:      Head: Normocephalic and atraumatic.   Eyes:      General: No scleral icterus.        Right eye: No discharge.         Left eye: No discharge.      Extraocular Movements: Extraocular movements intact.      Conjunctiva/sclera: Conjunctivae normal.   Pulmonary:      Effort: Pulmonary effort is normal. No respiratory distress.   Genitourinary:      Skin:     General: Skin is warm.   Neurological:      General: No focal deficit present.      Mental Status: He is alert and oriented to person, place, and time.   Psychiatric:         Mood and Affect: Mood normal.         Behavior: Behavior normal.         Thought Content: Thought content normal.         Judgment: Judgment normal.         DIAGNOSTIC DATA:    Prior note from February 2018 reviewed    ASSESSMENT:    Grade 2 internal hemorrhoids    PLAN:    Currently the patient has fairly minimally symptomatic grade 2 internal hemorrhoids on exam.  I advised him once again to increase dietary " fiber by taking fiber supplements twice daily.  We will see him back in approxi-1 week to see if this has helped alleviate his symptoms.  At that time we can discuss further whether he would like to pursue surgical treatment.          This document has been electronically signed by Jose Worrell MD on January 20, 2021 12:39 CST

## 2022-02-14 ENCOUNTER — CLINICAL SUPPORT (OUTPATIENT)
Dept: FAMILY MEDICINE CLINIC | Facility: CLINIC | Age: 37
End: 2022-02-14

## 2022-02-14 ENCOUNTER — LAB (OUTPATIENT)
Dept: LAB | Facility: HOSPITAL | Age: 37
End: 2022-02-14

## 2022-02-14 DIAGNOSIS — Z20.822 EXPOSURE TO COVID-19 VIRUS: ICD-10-CM

## 2022-02-14 DIAGNOSIS — R53.81 MALAISE: Primary | ICD-10-CM

## 2022-02-14 LAB — SARS-COV-2 N GENE RESP QL NAA+PROBE: DETECTED

## 2022-02-14 PROCEDURE — 99211 OFF/OP EST MAY X REQ PHY/QHP: CPT | Performed by: FAMILY MEDICINE

## 2022-02-14 PROCEDURE — 87635 SARS-COV-2 COVID-19 AMP PRB: CPT | Performed by: FAMILY MEDICINE

## 2023-03-03 ENCOUNTER — OFFICE VISIT (OUTPATIENT)
Dept: FAMILY MEDICINE CLINIC | Facility: CLINIC | Age: 38
End: 2023-03-03
Payer: COMMERCIAL

## 2023-03-03 VITALS
HEART RATE: 103 BPM | SYSTOLIC BLOOD PRESSURE: 130 MMHG | OXYGEN SATURATION: 100 % | TEMPERATURE: 98.7 F | DIASTOLIC BLOOD PRESSURE: 78 MMHG

## 2023-03-03 DIAGNOSIS — Z02.89 ENCOUNTER TO OBTAIN EXCUSE FROM WORK: Primary | ICD-10-CM

## 2023-03-03 PROCEDURE — 99212 OFFICE O/P EST SF 10 MIN: CPT | Performed by: NURSE PRACTITIONER

## 2023-03-03 NOTE — PROGRESS NOTES
Subjective   Raymon Gusman is a 37 y.o. male. Headache    History of Present Illness   Patient presents today for headache. He says he has had headaches intermittently since Tuesday. He thought headache maybe related to COVID 19. He says he had 2 negative COVID 19 tests at home. He does not want COVID 19 test now. He says he feels better, just needs work excuse.     The following portions of the patient's history were reviewed and updated as appropriate: allergies, current medications, past family history, past medical history, past social history, past surgical history, and problem list.    Review of Systems   Constitutional: Negative for chills, fatigue and fever.   HENT: Negative for congestion, ear pain, rhinorrhea and sore throat.    Eyes: Negative for blurred vision, double vision and visual disturbance.   Respiratory: Negative for cough, chest tightness, shortness of breath and wheezing.    Cardiovascular: Negative for chest pain, palpitations and leg swelling.   Gastrointestinal: Negative for abdominal pain, diarrhea, nausea and vomiting.   Endocrine: Negative for cold intolerance and heat intolerance.   Genitourinary: Negative for difficulty urinating, dysuria, frequency and hematuria.   Musculoskeletal: Negative for arthralgias, back pain, neck pain and neck stiffness.   Skin: Negative for dry skin, pallor, rash, skin lesions and wound.   Allergic/Immunologic: Negative for environmental allergies, food allergies and immunocompromised state.   Neurological: Positive for headache. Negative for dizziness, syncope, weakness, light-headedness and confusion.   Hematological: Negative for adenopathy. Does not bruise/bleed easily.   Psychiatric/Behavioral: Negative for self-injury, sleep disturbance, suicidal ideas, depressed mood and stress. The patient is not nervous/anxious.        Vitals:    03/03/23 1550 03/03/23 1557   BP: (!) 155/108 130/78   BP Location: Left arm    Patient Position: Sitting     Cuff Size: Large Adult    Pulse: 103    Temp: 98.7 °F (37.1 °C)    TempSrc: Infrared    SpO2: 100%    PainSc: 0-No pain        There is no height or weight on file to calculate BMI.    Objective   Physical Exam  Vitals and nursing note reviewed.   Constitutional:       General: He is not in acute distress.     Appearance: He is well-developed. He is not ill-appearing.   HENT:      Head: Normocephalic.      Right Ear: Hearing, tympanic membrane, ear canal and external ear normal.      Left Ear: Hearing, tympanic membrane, ear canal and external ear normal.      Nose: Nose normal.      Mouth/Throat:      Lips: Pink.      Mouth: Mucous membranes are moist.      Pharynx: Oropharynx is clear. Uvula midline. No oropharyngeal exudate.   Eyes:      General: Lids are normal. Gaze aligned appropriately.      Conjunctiva/sclera: Conjunctivae normal.      Pupils: Pupils are equal, round, and reactive to light.   Neck:      Thyroid: No thyroid mass, thyromegaly or thyroid tenderness.   Cardiovascular:      Rate and Rhythm: Normal rate and regular rhythm.      Heart sounds: Normal heart sounds, S1 normal and S2 normal. No murmur heard.  Pulmonary:      Effort: Pulmonary effort is normal.      Breath sounds: Normal breath sounds and air entry. No decreased breath sounds, wheezing, rhonchi or rales.   Abdominal:      General: Abdomen is flat. Bowel sounds are normal.      Palpations: Abdomen is soft.      Tenderness: There is no abdominal tenderness.   Musculoskeletal:         General: Normal range of motion.      Cervical back: Full passive range of motion without pain, normal range of motion and neck supple.   Lymphadenopathy:      Cervical: No cervical adenopathy.   Skin:     General: Skin is warm and dry.   Neurological:      General: No focal deficit present.      Mental Status: He is alert and oriented to person, place, and time.      Coordination: Coordination is intact.      Gait: Gait is intact.   Psychiatric:          Attention and Perception: Attention normal.         Mood and Affect: Mood normal.         Speech: Speech normal.         Behavior: Behavior normal. Behavior is cooperative.         Thought Content: Thought content normal.         Cognition and Memory: Cognition normal.         Judgment: Judgment normal.           Assessment & Plan   Diagnoses and all orders for this visit:    1. Encounter to obtain excuse from work (Primary)    Exam unremarkable.   Work excuse provided.  If symptoms do not improve or worsen, patient was instructed to return to clinic for further evaluation.         This document has been electronically signed by NICKI Colindres on  March 3, 2023 16:06 CST

## (undated) DEVICE — CANN SMPL SOFTECH BIFLO ETCO2 A/M 7FT

## (undated) DEVICE — SINGLE-USE BIOPSY FORCEPS: Brand: RADIAL JAW 4